# Patient Record
Sex: MALE | Race: WHITE | NOT HISPANIC OR LATINO | Employment: OTHER | URBAN - METROPOLITAN AREA
[De-identification: names, ages, dates, MRNs, and addresses within clinical notes are randomized per-mention and may not be internally consistent; named-entity substitution may affect disease eponyms.]

---

## 2017-06-26 ENCOUNTER — TRANSFERRED RECORDS (OUTPATIENT)
Dept: HEALTH INFORMATION MANAGEMENT | Facility: CLINIC | Age: 67
End: 2017-06-26

## 2018-01-01 ENCOUNTER — TRANSFERRED RECORDS (OUTPATIENT)
Dept: HEALTH INFORMATION MANAGEMENT | Facility: CLINIC | Age: 68
End: 2018-01-01

## 2018-01-02 ENCOUNTER — TRANSFERRED RECORDS (OUTPATIENT)
Dept: HEALTH INFORMATION MANAGEMENT | Facility: CLINIC | Age: 68
End: 2018-01-02

## 2018-01-19 ENCOUNTER — TRANSFERRED RECORDS (OUTPATIENT)
Dept: HEALTH INFORMATION MANAGEMENT | Facility: CLINIC | Age: 68
End: 2018-01-19

## 2018-04-06 ENCOUNTER — TRANSFERRED RECORDS (OUTPATIENT)
Dept: HEALTH INFORMATION MANAGEMENT | Facility: CLINIC | Age: 68
End: 2018-04-06

## 2018-10-11 ENCOUNTER — TRANSFERRED RECORDS (OUTPATIENT)
Dept: HEALTH INFORMATION MANAGEMENT | Facility: CLINIC | Age: 68
End: 2018-10-11

## 2018-10-17 ENCOUNTER — TRANSFERRED RECORDS (OUTPATIENT)
Dept: HEALTH INFORMATION MANAGEMENT | Facility: CLINIC | Age: 68
End: 2018-10-17

## 2019-07-04 ENCOUNTER — TRANSFERRED RECORDS (OUTPATIENT)
Dept: HEALTH INFORMATION MANAGEMENT | Facility: CLINIC | Age: 69
End: 2019-07-04

## 2019-10-02 ENCOUNTER — TRANSFERRED RECORDS (OUTPATIENT)
Dept: HEALTH INFORMATION MANAGEMENT | Facility: CLINIC | Age: 69
End: 2019-10-02

## 2020-07-15 ENCOUNTER — TRANSFERRED RECORDS (OUTPATIENT)
Dept: HEALTH INFORMATION MANAGEMENT | Facility: CLINIC | Age: 70
End: 2020-07-15

## 2020-07-27 ENCOUNTER — TRANSFERRED RECORDS (OUTPATIENT)
Dept: HEALTH INFORMATION MANAGEMENT | Facility: CLINIC | Age: 70
End: 2020-07-27

## 2020-10-28 ENCOUNTER — TRANSFERRED RECORDS (OUTPATIENT)
Dept: HEALTH INFORMATION MANAGEMENT | Facility: CLINIC | Age: 70
End: 2020-10-28

## 2020-12-16 ENCOUNTER — TRANSFERRED RECORDS (OUTPATIENT)
Dept: HEALTH INFORMATION MANAGEMENT | Facility: CLINIC | Age: 70
End: 2020-12-16

## 2020-12-17 ENCOUNTER — TRANSFERRED RECORDS (OUTPATIENT)
Dept: HEALTH INFORMATION MANAGEMENT | Facility: CLINIC | Age: 70
End: 2020-12-17

## 2020-12-21 ENCOUNTER — TRANSFERRED RECORDS (OUTPATIENT)
Dept: HEALTH INFORMATION MANAGEMENT | Facility: CLINIC | Age: 70
End: 2020-12-21

## 2020-12-22 ENCOUNTER — TRANSCRIBE ORDERS (OUTPATIENT)
Dept: OTHER | Age: 70
End: 2020-12-22

## 2020-12-22 DIAGNOSIS — C61 MALIGNANT NEOPLASM OF PROSTATE (H): Primary | ICD-10-CM

## 2020-12-31 NOTE — TELEPHONE ENCOUNTER
Mansi Mccallum 12/31/20 11:40AM   Action Taken [facility] recs scanned in epic   Images in [facility] PACS     CSS faxed to [facility] for BX slides

## 2020-12-31 NOTE — TELEPHONE ENCOUNTER
ONCOLOGY INTAKE: Records Information      APPT INFORMATION: 2/4/21 - Mario - Video  Referring provider:  Dr Elijah Odonnell  Referring provider s clinic:  [facility]  Reason for visit/diagnosis:  malignant neoplasm of prostate  Has patient been notified of appointment date and time?: Yes    RECORDS INFORMATION:  Were the records received with the referral (via [employer])? In Epic/CE    Has patient been seen for any external appt for this diagnosis? Yes    If yes, where? [facility]    ADDITIONAL INFORMATION:  Scheduled via call from patient / Mally

## 2021-02-04 ENCOUNTER — PRE VISIT (OUTPATIENT)
Dept: UROLOGY | Facility: CLINIC | Age: 71
End: 2021-02-04

## 2021-02-04 ENCOUNTER — VIRTUAL VISIT (OUTPATIENT)
Dept: UROLOGY | Facility: CLINIC | Age: 71
End: 2021-02-04
Attending: UROLOGY
Payer: COMMERCIAL

## 2021-02-04 DIAGNOSIS — C61 PROSTATE CANCER (H): Primary | ICD-10-CM

## 2021-02-04 PROCEDURE — 99204 OFFICE O/P NEW MOD 45 MIN: CPT | Mod: 95 | Performed by: UROLOGY

## 2021-02-04 RX ORDER — ASPIRIN 81 MG/1
81 TABLET ORAL DAILY
COMMUNITY

## 2021-02-04 RX ORDER — LISINOPRIL 10 MG/1
20 TABLET ORAL DAILY
COMMUNITY

## 2021-02-04 RX ORDER — SIMVASTATIN 40 MG
40 TABLET ORAL AT BEDTIME
COMMUNITY

## 2021-02-04 ASSESSMENT — PAIN SCALES - GENERAL: PAINLEVEL: NO PAIN (0)

## 2021-02-04 NOTE — PATIENT INSTRUCTIONS
Please make a appointment for a Biopsy and a biopsy follow up the week after     It was a pleasure meeting with you today.  Thank you for allowing me and my team the privilege of caring for you today.  YOU are the reason we are here, and I truly hope we provided you with the excellent service you deserve.  Please let us know if there is anything else we can do for you so that we can be sure you are leaving completely satisfied with your care experience.              [facility]  MRI Fusion Prostate Bx Patient Instructions  What is MRI Fusion Prostate Bx?  The MRI fusion biopsy is used to target a specific area for sampling. It requires a needle guide to be inserted into the rectum next to the prostate. Next, MR images are viewed, the abnormal area is identified, and a needle is inserted through the guide to the targeted area for tissue samples.  How do I prepare for the exam?    Take Cipro 500mg one tablet in the morning and one in the evening starting the day prior to procedure x 3 days    You will be receiving a Gentamicin intramuscular injection in the Urology clinic 30 min prior to your procedure. Please plan to arrive 30 min earlier.      On the day of the procedure eat and drink normally. Please do not fast or skip meals on the day of your procedure.    You will need to purchase one Fleets enema (or equivalent).  This can be purchased at any pharmacy or grocery store and will be found in the laxative section. We ask that you give the enema to yourself approximately 2 hours before your appointment time.    If you are taking any anticoagulation medications such as Coumadin, Jantoven, Warfarin, Xarelto, Pradaxa, or Eliquis special instructions will need to be discussed.    Do not take any of the following medications 7 days before your procedure:  - Anacin  - Bufferin  - Excedrin  - Motrin  - Naprosyn  - Feldene  - Plavix  - Ibuprofen  - Ecotrin   - Any other aspirin based  products.       How long will procedure take?  MRI fusion biopsy will take about 1 hr. Please allow 2 hrs for the whole procedure (including pre and post)  When will I know my results?  We will schedule a 2 week follow up appointment for you to review your pathology results with your physician.   *Please arrive 30 min prior to your scheduled procedure time*  Who do I contact if I have questions?  Please call Urology and [facility] at [phone] Opt # 3 to speak to a nurse.

## 2021-02-04 NOTE — PROGRESS NOTES
Steven Monge is a 70 year old male who is being evaluated via a billable video visit.      How would you like to obtain your AVS? Mail a copy  If the video visit is dropped, the invitation should be resent by: Send to e-mail at: [email or web address]  Will anyone else be joining your video visit? No    Video Start Time: 1:19 PM          Chief Complaint:   Prostate Cancer         Consult or Referral:     Mr. Steven Monge is a 70 year old male evaluated at the request of Dr. Odonnell.         History of Present Illness:    Steven Monge is a very pleasant 70 year old male who presents with a history of Prostate Cancer. Had TRUS 7/2020 and found to have [place] 3+3=6 prostate cancer. Subsequent MRI done 12/2020 demonstrates PIRADS 4 lesion. Most recent PSA 5.67. He has been referred for discussion of MRI-guided TRUS biopsy.     PSA   12/10/2020 - 5.67  6/11/2020 - 4.70  6/16/2020 - 4.87  10/5/2018 - 2.38    TRUS 7/2020  Cecilia 3+3=6 disease from solitary left apex core    MRI Prostate 12/17/2020  PIRADS 4 lesion         Past Medical History:   HTN  HLD  Prostate Cancer  Nephrolithiasis         Past Surgical History:   Shoulder replacement  Vein stripping  Right inguinal hernia  Ureteroscopy 2018         Medications     Current Outpatient Medications   Medication     aspirin 81 MG EC tablet     lisinopril (ZESTRIL) 10 MG tablet     simvastatin (ZOCOR) 40 MG tablet     No current facility-administered medications for this visit.             Family History:   No known family history of  malignancy.         Social History:     Social History     Socioeconomic History     Marital status:      Spouse name: Not on file     Number of children: Not on file     Years of education: Not on file     Highest education level: Not on file   Occupational History     Not on file   Social Needs     Financial resource strain: Not on file     Food insecurity     Worry: Not on file     Inability: Not on file     Transportation needs      Medical: Not on file     Non-medical: Not on file   Tobacco Use     Smoking status: Not on file   Substance and Sexual Activity     Alcohol use: Not on file     Drug use: Not on file     Sexual activity: Not on file   Lifestyle     Physical activity     Days per week: Not on file     Minutes per session: Not on file     Stress: Not on file   Relationships     Social connections     Talks on phone: Not on file     Gets together: Not on file     Attends Anabaptist service: Not on file     Active member of club or organization: Not on file     Attends meetings of clubs or organizations: Not on file     Relationship status: Not on file     Intimate partner violence     Fear of current or ex partner: Not on file     Emotionally abused: Not on file     Physically abused: Not on file     Forced sexual activity: Not on file   Other Topics Concern     Not on file   Social History Narrative     Not on file            Allergies:   Patient has no known allergies.         Review of Systems:      Constitutional, HEENT, cardiovascular, pulmonary, gi and gu systems are negative, except as otherwise noted.         Physical Exam:     Vitals:  No vitals were obtained today due to virtual visit.    Physical Exam   GENERAL: Healthy, alert and no distress  EYES: Eyes grossly normal to inspection.  No discharge or erythema, or obvious scleral/conjunctival abnormalities.  RESP: No audible wheeze, cough, or visible cyanosis.  No visible retractions or increased work of breathing.    SKIN: Visible skin clear. No significant rash, abnormal pigmentation or lesions.  NEURO: Cranial nerves grossly intact.  Mentation and speech appropriate for age.  PSYCH: Mentation appears normal, affect normal/bright, judgement and insight intact, normal speech and appearance well-groomed.      Outside and Past Medical records:    Review of external notes as documented above   Review of prior external note(s) from - Outside records from [facility]  Review of the  result(s) of each unique test - MRI, PSA, prostate biopsy pathology         Assessment and Plan:     Assessment: 70 year old male with Cecilia 3+3=6 prostate cancer and started with active surveillance. On MRI, found to have PIRADS 4, and referred to discuss MRI-guided biopsy. We discussed this today and the risks of biopsy, including bleeding and infection. He would like to proceed and will schedule in near future.    Plan:  Schedule MRI-guided TRUS biopsy    Video-Visit Details    Type of service:  Video Visit    Video End Time:1:32 PM    Originating Location (pt. Location): Home    Distant Location (provider location):  [facility] UROLOGY AND [facility] UROLOGIC [facility]    Platform used for Video Visit: Kory Martin MD  Urology  [facility]

## 2021-02-08 ENCOUNTER — TELEPHONE (OUTPATIENT)
Dept: ONCOLOGY | Facility: CLINIC | Age: 71
End: 2021-02-08

## 2021-04-20 ENCOUNTER — PRE VISIT (OUTPATIENT)
Dept: UROLOGY | Facility: CLINIC | Age: 71
End: 2021-04-20

## 2021-04-20 DIAGNOSIS — C61 PROSTATE CANCER (H): Primary | ICD-10-CM

## 2021-04-20 RX ORDER — CIPROFLOXACIN 500 MG/1
500 TABLET, FILM COATED ORAL 2 TIMES DAILY
Qty: 6 TABLET | Refills: 0 | Status: SHIPPED | OUTPATIENT
Start: 2021-04-20

## 2021-04-20 NOTE — TELEPHONE ENCOUNTER
Visit Type : Biopsy follow up     Records/Orders: pathology     Pt Contacted: no    At Rooming: phone

## 2021-04-21 ENCOUNTER — HOSPITAL ENCOUNTER (INPATIENT)
Dept: GENERAL RADIOLOGY | Facility: CLINIC | Age: 71
End: 2021-04-21
Attending: UROLOGY
Payer: COMMERCIAL

## 2021-04-21 DIAGNOSIS — C61 PROSTATE CANCER (H): ICD-10-CM

## 2021-04-29 ENCOUNTER — OFFICE VISIT (OUTPATIENT)
Dept: UROLOGY | Facility: CLINIC | Age: 71
End: 2021-04-29
Payer: COMMERCIAL

## 2021-04-29 VITALS
DIASTOLIC BLOOD PRESSURE: 79 MMHG | SYSTOLIC BLOOD PRESSURE: 144 MMHG | HEIGHT: 67 IN | WEIGHT: 190 LBS | HEART RATE: 68 BPM | BODY MASS INDEX: 29.82 KG/M2

## 2021-04-29 DIAGNOSIS — C61 PROSTATE CANCER (H): Primary | ICD-10-CM

## 2021-04-29 PROCEDURE — 55700 PR BIOPSY OF PROSTATE,NEEDLE/PUNCH: CPT | Performed by: UROLOGY

## 2021-04-29 PROCEDURE — 76872 US TRANSRECTAL: CPT | Performed by: UROLOGY

## 2021-04-29 PROCEDURE — G0416 PROSTATE BIOPSY, ANY MTHD: HCPCS | Mod: 26 | Performed by: PATHOLOGY

## 2021-04-29 PROCEDURE — 76999 ECHO EXAMINATION PROCEDURE: CPT | Performed by: UROLOGY

## 2021-04-29 RX ORDER — GENTAMICIN 40 MG/ML
80 INJECTION, SOLUTION INTRAMUSCULAR; INTRAVENOUS ONCE
Status: COMPLETED | OUTPATIENT
Start: 2021-04-29 | End: 2021-04-29

## 2021-04-29 RX ORDER — LIDOCAINE HYDROCHLORIDE 10 MG/ML
10 INJECTION, SOLUTION EPIDURAL; INFILTRATION; INTRACAUDAL; PERINEURAL ONCE
Status: COMPLETED | OUTPATIENT
Start: 2021-04-29 | End: 2021-04-29

## 2021-04-29 RX ADMIN — LIDOCAINE HYDROCHLORIDE 10 ML: 10 INJECTION, SOLUTION EPIDURAL; INFILTRATION; INTRACAUDAL; PERINEURAL at 13:28

## 2021-04-29 RX ADMIN — GENTAMICIN 80 MG: 40 INJECTION, SOLUTION INTRAMUSCULAR; INTRAVENOUS at 13:44

## 2021-04-29 ASSESSMENT — MIFFLIN-ST. JEOR: SCORE: 1580.46

## 2021-04-29 ASSESSMENT — PAIN SCALES - GENERAL: PAINLEVEL: NO PAIN (0)

## 2021-04-29 NOTE — LETTER
4/29/2021       RE: Steven Monge  [address] [place] [address]     Dear Colleague,    Thank you for referring your patient, Steven Monge, to the [facility]OLOG[facility] CL[facility] [place] at [facility]. Please see a copy of my visit note below.    PREPROCEDURE DIAGNOSIS: Prostate Cancer  POSTPROCEDURE DIAGNOSIS: Prostate Cancer  PROCEDURE: Transrectal ultrasound sizing and transrectal ultrasound guided prostatic needle biopsy, including MRI fusion targeting  for Steven Monge who is a 71 year old male. PSA 5.67. Hisotry of previous [place] 3+3=6 prostate cancer diagnosed 7/2020.  SURGEON: Shane Martin  ANESTHESIA: 5 mL of 1% periprostatic block bilaterally   We previously obtained an MRI of the prostate and identified all PIRADS 3-5 lesions for targeting    Target Lesion #1 PIRADS 4 - left mid gland transition zone    DESCRIPTION OF PROCEDURE: The procedure, the outcome, the anesthesia, and the risks were discussed with the patient. Informed consent was obtained and signed and a timeout was completed prior to the procedure. Digital rectal examination was performed with the below findings noted. Anesthesia was administered as noted above and the transrectal ultrasound probe was inserted, sizing was performed, and the below findings were noted. Two core biopsies were taken from each of the MRI targets, and 12 core biopsies were taken as described below. The probe was then withdrawn. Patient tolerated the procedure well.   FINDINGS: Digital rectal exam reveals normal prostate. Total volume is 26 mL. Hypoechoic lesion bilaterally. US images were obtained and then fused with the MRI images.  The fused images were then used to guide the biopsy of the targeted lesions with 2 cores taken of each lesion.  We then performed random biopsies.  12 cores were taken with 6 on each side, base, mid and apex.  PLAN: Will follow-up next week to review results.  Shane Martin,  MD  Urology  [facility]

## 2021-04-29 NOTE — PROGRESS NOTES
PREPROCEDURE DIAGNOSIS: Prostate Cancer  POSTPROCEDURE DIAGNOSIS: Prostate Cancer  PROCEDURE: Transrectal ultrasound sizing and transrectal ultrasound guided prostatic needle biopsy, including MRI fusion targeting  for Steven Monge who is a 71 year old male. PSA 5.67. Hisotry of previous Cecilia 3+3=6 prostate cancer diagnosed 7/2020.  SURGEON: Shane Martin  ANESTHESIA: 5 mL of 1% periprostatic block bilaterally   We previously obtained an MRI of the prostate and identified all PIRADS 3-5 lesions for targeting    Target Lesion #1 PIRADS 4 - left mid gland transition zone    DESCRIPTION OF PROCEDURE: The procedure, the outcome, the anesthesia, and the risks were discussed with the patient. Informed consent was obtained and signed and a timeout was completed prior to the procedure. Digital rectal examination was performed with the below findings noted. Anesthesia was administered as noted above and the transrectal ultrasound probe was inserted, sizing was performed, and the below findings were noted. Two core biopsies were taken from each of the MRI targets, and 12 core biopsies were taken as described below. The probe was then withdrawn. Patient tolerated the procedure well.   FINDINGS: Digital rectal exam reveals normal prostate. Total volume is 26 mL. Hypoechoic lesion bilaterally. US images were obtained and then fused with the MRI images.  The fused images were then used to guide the biopsy of the targeted lesions with 2 cores taken of each lesion.  We then performed random biopsies.  12 cores were taken with 6 on each side, base, mid and apex.  PLAN: Will follow-up next week to review results.  Shane Martin MD  Urology  [facility]

## 2021-04-29 NOTE — NURSING NOTE
"Chief Complaint   Patient presents with     Prostate Biopsy     MRI guided       Blood pressure (!) 144/79, pulse 68, height 1.702 m (5' 7\"), weight 86.2 kg (190 lb). Body mass index is 29.76 kg/m .    Patient Active Problem List   Diagnosis     Prostate cancer (H)       No Known Allergies    Current Outpatient Medications   Medication Sig Dispense Refill     ciprofloxacin (CIPRO) 500 MG tablet Take 1 tablet (500 mg) by mouth 2 times daily 6 tablet 0     lisinopril (ZESTRIL) 10 MG tablet Take 20 mg by mouth daily       simvastatin (ZOCOR) 40 MG tablet Take 40 mg by mouth At Bedtime       aspirin 81 MG EC tablet Take 81 mg by mouth daily         Social History     Tobacco Use     Smoking status: Never Smoker     Smokeless tobacco: Never Used   Substance Use Topics     Alcohol use: None     Drug use: None       Invasive Procedure Safety Checklist:    Procedure: MRI fusion TRUS prostate biopsy    Action: Complete sections and checkboxes as appropriate.    Pre-procedure:  1. Patient ID Verified with 2 identifiers (Radha and  or MRN) : YES    2. Procedure and site verified with patient/designee (when able) : YES    3. Accurate consent documentation in medical record : YES    4. H&P (or appropriate assessment) documented in medical record : N/A  H&P must be up to 30 days prior to procedure an updated within 24 hours of                 Procedure as applicable.     5. Relevant diagnostic and radiology test results appropriately labeled and displayed as applicable : YES    6. Blood products, implants, devices, and/or special equipment available for the procedure as applicable : YES    7. Procedure site(s) marked with provider initials [Exclusions: none] : NO    8. Marking not required. Reason : Yes  Procedure does not require site marking    Time Out:     Time-Out performed immediately prior to starting procedure, including verbal and active participation of all team members addressing: YES    1. Correct patient " identity.  2. Confirmed that the correct side and site are marked.  3. An accurate procedure to be done.  4. Agreement on the procedure to be done.  5. Correct patient position.  6. Relevant images and results are properly labeled and appropriately displayed.  7. The need to administer antibiotics or fluids for irrigation purposes during the procedure as applicable.  8. Safety precautions based on patient history or medication use.    During Procedure: Verification of correct person, site, and procedure occurs any time the responsibility for care of the patient is transferred to another member of the care team.    Gentamicin administered by Leni Dooley and documented on a separate note.     The following medication was given:     MEDICATION:  Lidocaine without epinephrine 1%  ROUTE: administered by physician - prostate nerve block   SITE: administered by physician - prostate nerve block    DOSE: 10 mL  LOT #: [id number]  : [employer]  EXPIRATION DATE: 12/24  NDC#: 98867-064-89   Was there drug waste? Yes  Amount of drug waste (mL): 20 mL.  Reason for waste:  Single use vial    Prior to injection, verified patient identity using patient's name and date of birth.  Due to injection administration, patient instructed to remain in clinic for 15 minutes  afterwards, and to report any adverse reaction to me immediately.    Drug Amount Wasted:  Yes: 20 mL (10 mg/ml )  Vial/Syringe: Single dose vial    JOSHUA CHANDLER, EMT  4/29/2021  1:35 PM

## 2021-04-29 NOTE — PATIENT INSTRUCTIONS
[facility]  Precautions Following a Prostate Biopsy    There are four conditions that you should watch for after a prostate biopsy:    1. Excessive pain  2. Bleeding irregularities (passing numerous  dime sized  clots or if your urine looks like cranberry juice)  3. Fever of 100 degrees or more  4. If you are unable to urinate        If any of these occur, call the Urology Clinic during normal business hours (M-F, 8:00-4:30) at [phone].  If you experience a problem after normal business hours, call our 24-hour phone number at [phone] and ask for the Urology Resident on call to be paged.      If you experience any discomfort following the biopsy, you may take Tylenol.  DO NOT TAKE ASPIRIN unless specified by your physician.   If the discomfort becomes severe or uncontrolled by medication, contact the Urology Clinic or Urology Resident (after normal business hours).      Do not be alarmed if you have some blood in your stool, in your urine, or ejaculate (semen).  This occurrence is normal and may last up to three (3) or four (4) days, usually intermittently.  Blood in the ejaculate (semen) may last several weeks, up to about a dozen ejaculations.  The blood in your ejaculate may appear as brown streaks, blood tinged, and immediately following a biopsy, it may appear bright red.      If you run a fever above 100 degrees, call the Urology Clinic or Urology Resident (after normal business hours) immediately.  If you are unable to reach your physician or the Resident on call, go to the nearest emergency room.  Explain that you have had a transrectal biopsy of your prostate and what problems you are experiencing.        You should attempt to urinate following your biopsy before you leave the clinic.  If you are unable to urinate four (4) to six (6) hours after you leave the clinic, you will need to contact the Urology Clinic or the Resident on call.  If you are unable to reach  your physician or the Resident on call, go to the nearest emergency room.            If you have any questions or concerns after your biopsy, feel free to contact the Urology Clinic at [phone] during M-F, 8:00-5pm business hours.  If you need to speak with someone after normal business hours, call [phone] and ask for the Resident on call to be paged.

## 2021-04-29 NOTE — NURSING NOTE
The following medication was given:     MEDICATION:  Gentamicin   ROUTE: IM  SITE: Ventrogluteal - Right  DOSE: 80 mg/ 2 mL  LOT #: [id number]  : [employer]  EXPIRATION DATE: 12/21  NDC#: 16223-943-08   Was there drug waste? No    Prior to injection, verified patient identity using patient's name and date of birth.  Due to injection administration, patient instructed to remain in clinic for 15 minutes  afterwards, and to report any adverse reaction to me immediately.    Drug Amount Wasted:  None.  Vial/Syringe: Single dose vial    Leni Dooley CMA  April 29, 2021  1:46 PM

## 2021-04-30 LAB — COPATH REPORT: NORMAL

## 2021-05-06 ENCOUNTER — VIRTUAL VISIT (OUTPATIENT)
Dept: UROLOGY | Facility: CLINIC | Age: 71
End: 2021-05-06
Payer: COMMERCIAL

## 2021-05-06 DIAGNOSIS — C61 PROSTATE CANCER (H): Primary | ICD-10-CM

## 2021-05-06 PROCEDURE — 99214 OFFICE O/P EST MOD 30 MIN: CPT | Mod: TEL | Performed by: UROLOGY

## 2021-05-06 NOTE — PATIENT INSTRUCTIONS
Return if symptoms worsen or fail to improve.    It was a pleasure meeting with you today.  Thank you for allowing me and my team the privilege of caring for you today.  YOU are the reason we are here, and I truly hope we provided you with the excellent service you deserve.  Please let us know if there is anything else we can do for you so that we can be sure you are leaving completely satisfied with your care experience.

## 2021-05-06 NOTE — PROGRESS NOTES
Steven Monge is a 71 year old male who is being evaluated via a billable telephone visit.      What phone number would you like to be contacted at? [phone]  How would you like to obtain your AVS? Mail a copy    Chief Complaint   It was my pleasure to speak with Steven Monge who is a 71 year old male for follow-up of prostate cancer.      HPI  Steven Monge is a very pleasant 71 year old male who presents with a history of Prostate Cancer. Had TRUS 7/2020 and found to have Cecilia 3+3=6 prostate cancer. Subsequent MRI done 12/2020 demonstrates PIRADS 4 lesion. Most recent PSA 5.67. He has been referred for discussion of MRI-guided TRUS biopsy. Found to have Astoria 3+4=7 on the fusion biopsy.     PSA   12/10/2020 - 5.67  6/11/2020 - 4.70  6/16/2020 - 4.87  10/5/2018 - 2.38     TRUS 7/2020  Astoria 3+3=6 disease from solitary left apex core     MRI Prostate 12/17/2020  PIRADS 4 lesion    TRUS 4/29/2021   FINAL DIAGNOSIS:   A. Prostate biopsy, right base:   - Benign prostate tissue     B. Prostate biopsy, right mid:   - Benign prostate tissue     C. Prostate biopsy, right apex:   - Benign prostate tissue     D. Prostate biopsy, left base:   - Benign prostate tissue     E. Prostate biopsy, left mid:   - Benign prostate tissue     F. Prostate biopsy, left apex:   - Prostatic adenocarcinoma, acinar type   - Astoria score 6 (3+3)   - Grade Group 1   - Extent: Involves 1/2 cores (6 mm, 60%)     G. Prostate needle biopsy, Target 1:   - Prostatic adenocarcinoma, acinar type   - Astoria score 7 (3+4; 20% pattern 4)   - Grade Group 2   - Extent: Involves 2/2 cores (6 mm, 60% and 5 mm, 50%)     I have reviewed and updated the patient's Past Medical History, Social History, Family History and Medication List.    Review of Systems   Constitutional, HEENT, cardiovascular, pulmonary, gi and gu systems are negative, except as otherwise noted.    ALLERGIES  Patient has no known allergies.    Vitals:  No vitals were obtained today due  to virtual visit.    Physical Exam   healthy, alert and no distress  PSYCH: Alert and oriented times 3; coherent speech, normal   rate and volume, able to articulate logical thoughts, able   to abstract reason, no tangential thoughts, no hallucinations   or delusions  His affect is normal and pleasant  RESP: No cough, no audible wheezing, able to talk in full sentences  Remainder of exam unable to be completed due to telephone visits      Outside and Past Medical records:    Review of the result(s) of each unique test - PSA, MRI, pathology    ASSESSMENT and PLAN  71 year old male with Cecilia 3+4=7 on Fusion biopsy. We had an extensive discussion about the significance of localized, prostate cancer. We discussed the options for treatment of a localized prostate cancer including active surveillance, brachytherapy, external beam radiation therapy, and surgical removal. We discussed that based on his Cecilia score he is not longer an ideal candidate for active surveillance.       We discussed the relative merits of robotic assisted radical prostatectomy. We also discussed the advantages and disadvantages and roles of open surgery vs. laparoscopic (and Da Yu assisted) surgery. The anticipated post-operative course was explained, including an anticipated 1-2 day hospital stay. Catheter will remain in place 10-14 days.      We discussed that there was no clear evidence of advantage between surgery and radiation with regard to risk of recurrence. Regarding radiation, we discussed risks including but not limited to cancer recurrence, exacerbation of voiding symptoms or hematuria, urinary retention, incontinence, stricture of the urinary tract, induction of second malignancy, and risks of rectal symptoms or bleeding.  We discussed fact that rectal symptoms may be more common with radiation than with other treatment modalities. With radiation therapy, we also discussed the difficulties in diagnosing recurrent disease at an  early stage due to variability in PSA levels and the fact that most patients are not candidates for local salvage therapy when biochemical recurrence is declared.  We also discussed the significant morbidity of post-radiation local salvage therapy in terms of perioperative complications, erectile dysfunction and urinary incontinence. With surgery, we also discussed the potential advantage over radiation therapy in that biochemical recurrence can be detected at a relatively earlier stage and that salvage radiotherapy is successful in controlling recurrent disease in a substantial proportion of patients.  We also discussed that salvage radiotherapy was associated with a considerably more favorable morbidity profile compared to local salvage therapies for recurrent disease post-radiation.     We discussed option for further discussion with radiation oncology, but patient is interested in surgery and would prefer to defer at this time.      The risks, benefits, alternatives, and personnel involved with robotic prostatectomy were discussed in detail. Specifically, we discussed that risks include but are not limited to anesthetic complications including stroke, MI, DVT/PE, as well as risk of bleeding requiring transfusion, bowel injury, infection, lymphocele, ureteral injury, nerve injury,urine leak and other potentially unforseen complications. Also discussed the risk of bladder neck contracture and other urinary symptoms after procedure. In addition, we discussed risk long-term of urinary incontinence and erectile dysfunction following the procedure. Finally, we discussed risk for adverse pathologic features, including positive surgical margins and potential for post-surgical radiation, hormone ablation and long-term need for PSA monitoring.  All questions were answered in detail.  A written informed consent will be finalized on the morning of the procedure.    Cathy Saleem expresses a desire to consult further with   [facility], as he would prefer to have his surgery done closer to home, which is very reasonable. He will contact Dr. Odonnell's office to arrange follow-up. I am happy to see him back in the future with any questions or concerns.    - Will follow-up with Dr. Odonnell. Planning for prostatectomy    Phone call duration: 25 minutes    30 total minutes spent on the date of the encounter including direct interaction with the patient, performing chart review, documentation and further activities as noted above.    Shane Martin MD  Urology  [facility]

## 2022-09-28 ENCOUNTER — TRANSCRIBE ORDERS (OUTPATIENT)
Dept: OTHER | Age: 72
End: 2022-09-28
